# Patient Record
Sex: MALE | URBAN - METROPOLITAN AREA
[De-identification: names, ages, dates, MRNs, and addresses within clinical notes are randomized per-mention and may not be internally consistent; named-entity substitution may affect disease eponyms.]

---

## 2018-02-12 PROCEDURE — 99283 EMERGENCY DEPT VISIT LOW MDM: CPT

## 2018-02-12 ASSESSMENT — PAIN SCALES - GENERAL: PAINLEVEL_OUTOF10: 7

## 2018-02-13 ENCOUNTER — APPOINTMENT (OUTPATIENT)
Dept: RADIOLOGY | Facility: MEDICAL CENTER | Age: 58
End: 2018-02-13
Attending: EMERGENCY MEDICINE
Payer: COMMERCIAL

## 2018-02-13 ENCOUNTER — HOSPITAL ENCOUNTER (EMERGENCY)
Facility: MEDICAL CENTER | Age: 58
End: 2018-02-13
Attending: EMERGENCY MEDICINE
Payer: COMMERCIAL

## 2018-02-13 VITALS
BODY MASS INDEX: 21.86 KG/M2 | WEIGHT: 136.02 LBS | HEIGHT: 66 IN | RESPIRATION RATE: 20 BRPM | SYSTOLIC BLOOD PRESSURE: 133 MMHG | DIASTOLIC BLOOD PRESSURE: 80 MMHG | HEART RATE: 82 BPM | TEMPERATURE: 98 F | OXYGEN SATURATION: 97 %

## 2018-02-13 DIAGNOSIS — M21.619 BUNION: ICD-10-CM

## 2018-02-13 PROCEDURE — 73620 X-RAY EXAM OF FOOT: CPT | Mod: LT

## 2018-02-13 RX ORDER — TRAZODONE HYDROCHLORIDE 50 MG/1
50 TABLET ORAL NIGHTLY
COMMUNITY

## 2018-02-13 NOTE — DISCHARGE INSTRUCTIONS
Bunion  You have a bunion deformity of the feet. This is more common in women. It tends to be an inherited problem. Symptoms can include pain, swelling, and deformity around the great toe. Numbness and tingling may also be present. Your symptoms are often worsened by wearing shoes that cause pressure on the bunion. Changing the type of shoes you wear helps reduce symptoms. A wide shoe decreases pressure on the bunion. An arch support may be used if you have flat feet. Avoid shoes with heels higher than two inches. This puts more pressure on the bunion.  X-rays may be helpful in evaluating the severity of the problem. Other foot problems often seen with bunions include corns, calluses, and hammer toes. If the deformity or pain is severe, surgical treatment may be necessary. Keep off your painful foot as much as possible until the pain is relieved. Call your caregiver if your symptoms are worse.   SEEK IMMEDIATE MEDICAL CARE IF:   You have increased redness, pain, swelling, or other symptoms of infection.  Document Released: 12/18/2006 Document Revised: 03/11/2013 Document Reviewed: 06/16/2008  [employer]® Patient Information ©2014 [employer].
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
